# Patient Record
Sex: MALE | URBAN - METROPOLITAN AREA
[De-identification: names, ages, dates, MRNs, and addresses within clinical notes are randomized per-mention and may not be internally consistent; named-entity substitution may affect disease eponyms.]

---

## 2022-05-09 ENCOUNTER — TELEPHONE ENCOUNTER (OUTPATIENT)
Dept: URBAN - METROPOLITAN AREA CLINIC 109 | Facility: CLINIC | Age: 77
End: 2022-05-09

## 2022-05-18 ENCOUNTER — OFFICE VISIT (OUTPATIENT)
Dept: URBAN - METROPOLITAN AREA CLINIC 118 | Facility: CLINIC | Age: 77
End: 2022-05-18

## 2022-05-19 ENCOUNTER — OFFICE VISIT (OUTPATIENT)
Dept: URBAN - METROPOLITAN AREA CLINIC 118 | Facility: CLINIC | Age: 77
End: 2022-05-19
Payer: MEDICARE

## 2022-05-19 ENCOUNTER — LAB OUTSIDE AN ENCOUNTER (OUTPATIENT)
Dept: URBAN - METROPOLITAN AREA CLINIC 118 | Facility: CLINIC | Age: 77
End: 2022-05-19

## 2022-05-19 VITALS
DIASTOLIC BLOOD PRESSURE: 81 MMHG | SYSTOLIC BLOOD PRESSURE: 143 MMHG | BODY MASS INDEX: 22.28 KG/M2 | TEMPERATURE: 97.3 F | WEIGHT: 173.6 LBS | HEART RATE: 71 BPM | HEIGHT: 74 IN

## 2022-05-19 DIAGNOSIS — R19.7 DIARRHEA, UNSPECIFIED TYPE: ICD-10-CM

## 2022-05-19 DIAGNOSIS — R15.9 FULL INCONTINENCE OF FECES: ICD-10-CM

## 2022-05-19 DIAGNOSIS — D64.9 LOW HEMOGLOBIN: ICD-10-CM

## 2022-05-19 DIAGNOSIS — D64.89 ANEMIA DUE TO OTHER CAUSE: ICD-10-CM

## 2022-05-19 PROCEDURE — 99204 OFFICE O/P NEW MOD 45 MIN: CPT

## 2022-05-19 NOTE — HPI-TODAY'S VISIT:
Mr. Pacheco is a 76 y/o male who presents today due to diarrhea and incontinence for the past 3 weeks. Patient is a poor historian. He was seen at [facility] 4/17/22 for hypoxic respiratory failure 2/2 to inhaling a toxic chemical in his house. Hx of cocaine abuse per hospital note from [facility]. During his 10 day hospital stay he was given lactulose and Miralax due to his inability to have a BM. He thinks he was on antibiotics while he was in the hospital but he cannot recall. In the beginning he was having 5-6 episodes of diarrhea at day and admitted to a large amount of "leaking stool". He reports that over the past 48 hours he has not had any episodes of diarrhea and the incontinence has improved as well. He did admit to a small amount of intermittent blood but has not seen any recently. His last colonoscopy was with Dr. Jimenez 07/2014 and there was one precancerous polyp. Labs done with Dr. Ramírez showed a decrease in hgb from above 13 on 1/11/22 and 02/09/22 down to 11.1 on 05/02/22. Iron sat of 10% on 04/25/22.

## 2022-05-19 NOTE — PHYSICAL EXAM GASTROINTESTINAL
Abdomen , soft, nontender, nondistended , no guarding or rigidity , no masses palpable , normal bowel sounds , Liver and Spleen , no hepatosplenomegaly present, liver nontender Rectal  deferred

## 2022-05-19 NOTE — PHYSICAL EXAM NECK/THYROID:
normal appearance , without tenderness upon palpation , no deformities , trachea midline , Thyroid normal size , no thyroid nodules, no masses, no lymphadenopathy

## 2022-05-19 NOTE — PHYSICAL EXAM CHEST:
no lesions,  no deformities,  no traumatic injuries,  no significant scars are present,  chest wall non-tender,  no masses present, breathing is unlabored without accessory muscle use, clear to auscultation bilaterally

## 2022-05-19 NOTE — PHYSICAL EXAM EYES:
Conjuntivae and eyelids appear normal,  Sclerae : Right  white without injection, left is red due to sx on Retina 2 days ago

## 2022-05-19 NOTE — PHYSICAL EXAM CONSTITUTIONAL:
well developed, well nourished , in no acute distress , ambulating with a cane , normal communication ability

## 2022-08-03 ENCOUNTER — TELEPHONE ENCOUNTER (OUTPATIENT)
Dept: URBAN - METROPOLITAN AREA CLINIC 118 | Facility: CLINIC | Age: 77
End: 2022-08-03

## 2022-08-22 ENCOUNTER — TELEPHONE ENCOUNTER (OUTPATIENT)
Dept: URBAN - METROPOLITAN AREA CLINIC 118 | Facility: CLINIC | Age: 77
End: 2022-08-22

## 2022-08-22 ENCOUNTER — OFFICE VISIT (OUTPATIENT)
Dept: URBAN - METROPOLITAN AREA MEDICAL CENTER 16 | Facility: MEDICAL CENTER | Age: 77
End: 2022-08-22
Payer: MEDICARE

## 2022-08-22 DIAGNOSIS — R19.7 ACUTE DIARRHEA: ICD-10-CM

## 2022-08-22 DIAGNOSIS — K63.89 BACTERIAL OVERGROWTH SYNDROME: ICD-10-CM

## 2022-08-22 DIAGNOSIS — K63.3 APHTHOUS ULCER OF COLON: ICD-10-CM

## 2022-08-22 DIAGNOSIS — K56.699 COLON STRICTURE: ICD-10-CM

## 2022-08-22 PROCEDURE — 45331 SIGMOIDOSCOPY AND BIOPSY: CPT | Performed by: INTERNAL MEDICINE

## 2022-08-22 RX ORDER — CHOLESTYRAMINE 4 G/9G
1 SCOOP POWDER, FOR SUSPENSION ORAL ONCE A DAY
Qty: 1 | Refills: 5 | OUTPATIENT
Start: 2022-08-22

## 2022-08-23 ENCOUNTER — ERX REFILL RESPONSE (OUTPATIENT)
Dept: URBAN - METROPOLITAN AREA CLINIC 118 | Facility: CLINIC | Age: 77
End: 2022-08-23

## 2022-08-23 RX ORDER — CHOLESTYRAMINE 4 G/9G
1 SCOOP POWDER, FOR SUSPENSION ORAL ONCE A DAY
Qty: 1 | Refills: 5 | OUTPATIENT

## 2022-08-23 RX ORDER — CHOLESTYRAMINE 4 G/9G
1 SCOOP POWDER, FOR SUSPENSION ORAL ONCE A DAY
Qty: 30 | Refills: 5 | OUTPATIENT

## 2022-09-06 ENCOUNTER — LAB OUTSIDE AN ENCOUNTER (OUTPATIENT)
Dept: URBAN - METROPOLITAN AREA CLINIC 118 | Facility: CLINIC | Age: 77
End: 2022-09-06

## 2022-09-06 ENCOUNTER — TELEPHONE ENCOUNTER (OUTPATIENT)
Dept: URBAN - METROPOLITAN AREA CLINIC 118 | Facility: CLINIC | Age: 77
End: 2022-09-06

## 2022-10-20 ENCOUNTER — OFFICE VISIT (OUTPATIENT)
Dept: URBAN - METROPOLITAN AREA CLINIC 118 | Facility: CLINIC | Age: 77
End: 2022-10-20

## 2022-10-20 RX ORDER — CHOLESTYRAMINE 4 G/9G
1 SCOOP POWDER, FOR SUSPENSION ORAL ONCE A DAY
Qty: 30 | Refills: 5 | Status: ACTIVE | COMMUNITY

## 2022-10-21 ENCOUNTER — DASHBOARD ENCOUNTERS (OUTPATIENT)
Age: 77
End: 2022-10-21

## 2022-10-21 ENCOUNTER — OFFICE VISIT (OUTPATIENT)
Dept: URBAN - METROPOLITAN AREA CLINIC 118 | Facility: CLINIC | Age: 77
End: 2022-10-21
Payer: MEDICARE

## 2022-10-21 VITALS
WEIGHT: 179 LBS | SYSTOLIC BLOOD PRESSURE: 135 MMHG | BODY MASS INDEX: 22.97 KG/M2 | DIASTOLIC BLOOD PRESSURE: 75 MMHG | HEART RATE: 80 BPM | HEIGHT: 74 IN | TEMPERATURE: 97.9 F

## 2022-10-21 DIAGNOSIS — K76.0 FATTY LIVER: ICD-10-CM

## 2022-10-21 DIAGNOSIS — R19.5 DARK STOOLS: ICD-10-CM

## 2022-10-21 DIAGNOSIS — R15.9 INCONTINENCE OF FECES, UNSPECIFIED FECAL INCONTINENCE TYPE: ICD-10-CM

## 2022-10-21 PROBLEM — 72042002: Status: ACTIVE | Noted: 2022-10-21

## 2022-10-21 PROBLEM — 197321007: Status: ACTIVE | Noted: 2022-10-21

## 2022-10-21 PROCEDURE — 99214 OFFICE O/P EST MOD 30 MIN: CPT | Performed by: INTERNAL MEDICINE

## 2022-10-21 RX ORDER — CHOLESTYRAMINE 4 G/9G
1 SCOOP POWDER, FOR SUSPENSION ORAL ONCE A DAY
Qty: 30 | Refills: 5 | Status: ON HOLD | COMMUNITY

## 2022-10-21 NOTE — PHYSICAL EXAM HENT:
Head,  normocephalic,  atraumatic,  Face,  Face within normal limits,  Ears,  External ears within normal limits
no

## 2022-10-21 NOTE — HPI-TODAY'S VISIT:
Mr. Pacheco is a 77 y/o AAM who presents today for f/u on recent colonoscopy.  He had colonoscopy done 8/22/22 which showed colonic stenosis in the rectosigmoid colon with linear ulceration. This along with previous hemorroidectomy is likely contributing to pts diarrhea and incontience.  CT scan 9/25/22 shows fatty liver, but otherwise normal.  Pt states he had history of colon cancer that was diagnosed when he had his hemorrhoids removed. He is poor historian and records not available at time of visit.  Today, pt reports continued incontinence. He reports some episodes of dark stool that started when he began taking iron supplements. He denies any GI bleeding, NV, changes in bowel habits, abdominal pain or unintentional weight loss. Denies dysphagia or upper GI symptoms.

## 2022-10-22 LAB
ABSOLUTE BASOPHILS: 19
ABSOLUTE EOSINOPHILS: 59
ABSOLUTE LYMPHOCYTES: 1665
ABSOLUTE MONOCYTES: 455
ABSOLUTE NEUTROPHILS: 1502
BASOPHILS: 0.5
EOSINOPHILS: 1.6
HEMATOCRIT: 39.7
HEMOGLOBIN: 12.7
LYMPHOCYTES: 45
MCH: 26.6
MCHC: 32
MCV: 83.2
MONOCYTES: 12.3
MPV: 10.3
NEUTROPHILS: 40.6
PLATELET COUNT: 171
RDW: 14.9
RED BLOOD CELL COUNT: 4.77
WHITE BLOOD CELL COUNT: 3.7

## 2022-11-07 ENCOUNTER — OFFICE VISIT (OUTPATIENT)
Dept: URBAN - METROPOLITAN AREA CLINIC 118 | Facility: CLINIC | Age: 77
End: 2022-11-07

## 2023-01-20 ENCOUNTER — OFFICE VISIT (OUTPATIENT)
Dept: URBAN - METROPOLITAN AREA CLINIC 118 | Facility: CLINIC | Age: 78
End: 2023-01-20

## 2023-02-21 PROBLEM — 1086911000119107: Status: ACTIVE | Noted: 2022-05-19
